# Patient Record
Sex: FEMALE | Race: WHITE | NOT HISPANIC OR LATINO
[De-identification: names, ages, dates, MRNs, and addresses within clinical notes are randomized per-mention and may not be internally consistent; named-entity substitution may affect disease eponyms.]

---

## 2022-11-15 ENCOUNTER — RESULT REVIEW (OUTPATIENT)
Age: 87
End: 2022-11-15

## 2023-04-11 ENCOUNTER — TRANSCRIPTION ENCOUNTER (OUTPATIENT)
Age: 88
End: 2023-04-11

## 2023-04-14 ENCOUNTER — TRANSCRIPTION ENCOUNTER (OUTPATIENT)
Age: 88
End: 2023-04-14

## 2023-04-16 ENCOUNTER — TRANSCRIPTION ENCOUNTER (OUTPATIENT)
Age: 88
End: 2023-04-16

## 2023-04-17 ENCOUNTER — TRANSCRIPTION ENCOUNTER (OUTPATIENT)
Age: 88
End: 2023-04-17

## 2023-04-17 ENCOUNTER — APPOINTMENT (OUTPATIENT)
Dept: CARE COORDINATION | Facility: HOME HEALTH | Age: 88
End: 2023-04-17

## 2023-04-17 ENCOUNTER — APPOINTMENT (OUTPATIENT)
Dept: CARE COORDINATION | Facility: HOME HEALTH | Age: 88
End: 2023-04-17
Payer: COMMERCIAL

## 2023-04-17 DIAGNOSIS — Z86.39 PERSONAL HISTORY OF OTHER ENDOCRINE, NUTRITIONAL AND METABOLIC DISEASE: ICD-10-CM

## 2023-04-17 DIAGNOSIS — G40.909 EPILEPSY, UNSPECIFIED, NOT INTRACTABLE, W/OUT STATUS EPILEPTICUS: ICD-10-CM

## 2023-04-17 DIAGNOSIS — E11.9 TYPE 2 DIABETES MELLITUS W/OUT COMPLICATIONS: ICD-10-CM

## 2023-04-17 DIAGNOSIS — J12.9 VIRAL PNEUMONIA, UNSPECIFIED: ICD-10-CM

## 2023-04-17 DIAGNOSIS — Z78.9 OTHER SPECIFIED HEALTH STATUS: ICD-10-CM

## 2023-04-17 DIAGNOSIS — K74.60 UNSPECIFIED CIRRHOSIS OF LIVER: ICD-10-CM

## 2023-04-17 DIAGNOSIS — F10.11 ALCOHOL ABUSE, IN REMISSION: ICD-10-CM

## 2023-04-17 PROCEDURE — 99342 HOME/RES VST NEW LOW MDM 30: CPT | Mod: 95

## 2023-04-17 NOTE — HISTORY OF PRESENT ILLNESS
[Post-hospitalization from ___ Hospital] : Post-hospitalization from [unfilled] Hospital [Admitted on: ___] : The patient was admitted on [unfilled] [Discharged on ___] : discharged on [unfilled] [FreeTextEntry2] : FROM [facility] NOTE PROVIDER:\par 87-year-old gentle lady with PMH DM, HTN, HLD, Bladder cancer (Long time ago), Former Smoker, chronic dry cough, presented to the ED on April 6th for SOB, productive cough & wheezing.\par Pt was complaining of SOB, productive cough, & wheezing for 1 week, prescribed an antibiotics by PCP azithromycin for pneumonia. Pt cough got worse even on antibiotics and pt developed leg swelling as well. Pt was admitted for observation, started Inhalers & solumedrol. Pt denies fever, headaches, lightheadedness, dizziness, weakness, chest pain, abdominal pain, dysuria, flank pain, nausea vomiting diarrhea, calf pain, recent surgery or treatment for cancer. Never Diagnosed with Asthma, COPD, CAD, CKD.\par During Observation unit pt Bun /Cr worsen and pt admitted for ISSAC. She was treated for viral PNA with steroids and to complete po prednisone on dc. Liver cirrhosis: h/o ETOH, s/p paracentesis 2L. DM 2: on jardiance, A1c in non diabetic range, will f/u with PCP tomorrow in regard to dosing. Sz: on keppra, no reports of seizures. TCM reached out to [facility] to get home care referral. \par

## 2023-04-17 NOTE — REVIEW OF SYSTEMS
[Dyspnea on Exertion] : dyspnea on exertion [Negative] : Psychiatric [FreeTextEntry6] : at her baseline

## 2023-04-17 NOTE — PLAN
[FreeTextEntry1] : A/P:\par # VIral Pneumonia:\par - s/p IV solumedrol, inhalers\par - complete course po prednisone\par - robitussin prn, albuterol prn\par - call for any worsening of sx (fever, sob, hypoxia, cough)\par \par # Liver Cirrhosis:\par - h/o ETOH\par - s/p paracentesis 2L\par - f/u GI, adjust meds as needed\par \par # DM 2:\par - well controlled\par - A1c 4.9, diabetic diet\par - con't januvia daily\par - f/u PCP re: possible dose decrease\par \par # Sz:\par - h/o sz disorder\par - con't keppra 500mg daily (decreased from 1000mg per dtg)\par - no active or reported sz\par - seizure precautions

## 2023-04-17 NOTE — PHYSICAL EXAM
[No Acute Distress] : no acute distress [Well Developed] : well developed [Well-Appearing] : well-appearing [Normal Sclera/Conjunctiva] : normal sclera/conjunctiva [Normal Outer Ear/Nose] : the outer ears and nose were normal in appearance [No Respiratory Distress] : no respiratory distress  [No Accessory Muscle Use] : no accessory muscle use [No Edema] : there was no peripheral edema [No Rash] : no rash [Coordination Grossly Intact] : coordination grossly intact [No Focal Deficits] : no focal deficits [Normal Affect] : the affect was normal [Alert and Oriented x3] : oriented to person, place, and time [de-identified] : no visible rash, buttocks/sacrum intact, scattered ecchymosis BUE, senile purpura R germaine [de-identified] : WILKES x 4, symmetrical, transfers without assist with RW, tongue midline

## 2023-04-27 ENCOUNTER — TRANSCRIPTION ENCOUNTER (OUTPATIENT)
Age: 88
End: 2023-04-27